# Patient Record
Sex: FEMALE | Race: BLACK OR AFRICAN AMERICAN
[De-identification: names, ages, dates, MRNs, and addresses within clinical notes are randomized per-mention and may not be internally consistent; named-entity substitution may affect disease eponyms.]

---

## 2020-06-03 ENCOUNTER — HOSPITAL ENCOUNTER (EMERGENCY)
Dept: HOSPITAL 61 - ER | Age: 70
LOS: 1 days | Discharge: HOME | End: 2020-06-04
Payer: MEDICARE

## 2020-06-03 VITALS — WEIGHT: 146.17 LBS | HEIGHT: 61 IN | BODY MASS INDEX: 27.6 KG/M2

## 2020-06-03 DIAGNOSIS — M79.662: Primary | ICD-10-CM

## 2020-06-03 DIAGNOSIS — Z88.0: ICD-10-CM

## 2020-06-03 DIAGNOSIS — Z88.5: ICD-10-CM

## 2020-06-03 DIAGNOSIS — E78.00: ICD-10-CM

## 2020-06-03 DIAGNOSIS — Z90.710: ICD-10-CM

## 2020-06-03 DIAGNOSIS — F17.200: ICD-10-CM

## 2020-06-03 DIAGNOSIS — Z88.8: ICD-10-CM

## 2020-06-03 DIAGNOSIS — I10: ICD-10-CM

## 2020-06-03 DIAGNOSIS — Z86.73: ICD-10-CM

## 2020-06-03 LAB
ALBUMIN SERPL-MCNC: 3.5 G/DL (ref 3.4–5)
ALBUMIN/GLOB SERPL: 1 {RATIO} (ref 1–1.7)
ALP SERPL-CCNC: 112 U/L (ref 46–116)
ALT SERPL-CCNC: 16 U/L (ref 14–59)
ANION GAP SERPL CALC-SCNC: 10 MMOL/L (ref 6–14)
AST SERPL-CCNC: 12 U/L (ref 15–37)
BASOPHILS # BLD AUTO: 0.1 X10^3/UL (ref 0–0.2)
BASOPHILS NFR BLD: 1 % (ref 0–3)
BILIRUB SERPL-MCNC: 0.1 MG/DL (ref 0.2–1)
BUN SERPL-MCNC: 26 MG/DL (ref 7–20)
BUN/CREAT SERPL: 16 (ref 6–20)
CALCIUM SERPL-MCNC: 9.4 MG/DL (ref 8.5–10.1)
CHLORIDE SERPL-SCNC: 108 MMOL/L (ref 98–107)
CO2 SERPL-SCNC: 25 MMOL/L (ref 21–32)
CREAT SERPL-MCNC: 1.6 MG/DL (ref 0.6–1)
EOSINOPHIL NFR BLD: 0.1 X10^3/UL (ref 0–0.7)
EOSINOPHIL NFR BLD: 2 % (ref 0–3)
ERYTHROCYTE [DISTWIDTH] IN BLOOD BY AUTOMATED COUNT: 17.4 % (ref 11.5–14.5)
GFR SERPLBLD BASED ON 1.73 SQ M-ARVRAT: 38.7 ML/MIN
GLOBULIN SER-MCNC: 3.5 G/DL (ref 2.2–3.8)
GLUCOSE SERPL-MCNC: 117 MG/DL (ref 70–99)
HCT VFR BLD CALC: 37.2 % (ref 36–47)
HGB BLD-MCNC: 12.5 G/DL (ref 12–15.5)
LYMPHOCYTES # BLD: 2.6 X10^3/UL (ref 1–4.8)
LYMPHOCYTES NFR BLD AUTO: 32 % (ref 24–48)
MCH RBC QN AUTO: 27 PG (ref 25–35)
MCHC RBC AUTO-ENTMCNC: 34 G/DL (ref 31–37)
MCV RBC AUTO: 81 FL (ref 79–100)
MONO #: 0.7 X10^3/UL (ref 0–1.1)
MONOCYTES NFR BLD: 9 % (ref 0–9)
NEUT #: 4.6 X10^3/UL (ref 1.8–7.7)
NEUTROPHILS NFR BLD AUTO: 57 % (ref 31–73)
PLATELET # BLD AUTO: 322 X10^3/UL (ref 140–400)
POTASSIUM SERPL-SCNC: 3.5 MMOL/L (ref 3.5–5.1)
PROT SERPL-MCNC: 7 G/DL (ref 6.4–8.2)
RBC # BLD AUTO: 4.59 X10^6/UL (ref 3.5–5.4)
SODIUM SERPL-SCNC: 143 MMOL/L (ref 136–145)
WBC # BLD AUTO: 8.1 X10^3/UL (ref 4–11)

## 2020-06-03 PROCEDURE — 80053 COMPREHEN METABOLIC PANEL: CPT

## 2020-06-03 PROCEDURE — 85379 FIBRIN DEGRADATION QUANT: CPT

## 2020-06-03 PROCEDURE — 85025 COMPLETE CBC W/AUTO DIFF WBC: CPT

## 2020-06-03 PROCEDURE — 73590 X-RAY EXAM OF LOWER LEG: CPT

## 2020-06-03 PROCEDURE — 99285 EMERGENCY DEPT VISIT HI MDM: CPT

## 2020-06-03 PROCEDURE — 36415 COLL VENOUS BLD VENIPUNCTURE: CPT

## 2020-06-03 NOTE — RAD
EXAM: AP lateral views left tibia/fibula

 

DATE: 6/3/2020 11:11 PM

 

INDICATION: Left lower leg pain 

 

COMPARISON: No Prior

 

FINDINGS:

No evidence of acute fracture or dislocation. Diffuse soft tissue swelling

about the left lower leg. Small posterior calcaneal osteophyte.

 

IMPRESSION:

No evidence of acute fracture or dislocation.

 

Electronically signed by: Wesley Fernandez MD (6/3/2020 11:34 PM) [facility]

## 2020-06-03 NOTE — PHYS DOC
Past Medical History


Past Medical History:  High Cholesterol, Hypertension, Kidney Infection, TIA, 

Other


Additional Past Medical Histor:  KIDNEY DISEASE


Past Surgical History:  Hysterectomy, Other


Additional Past Surgical Histo:  CAROTID ENDARDARECTOMY, BREAST BIOPSY x2


Smoking Status:  Current Every Day Smoker


Additional Information:  


1/2 ppd


Alcohol Use:  None


Drug Use:  Marijuana





General Adult


EDM:


Chief Complaint:  LOWER EXT PAIN





HPI:


HPI:





Patient is a 70  year old female who presents with complaint of left lower leg 

pain that started earlier today.  Patient states that it feels like it swollen 

and is getting painful in the calf.  She states that pain is worsened with 

weightbearing.  She denies any chest pain, cough or shortness of breath.  

Patient rates pain at a 6 out of 10.  She denies any fever.  []





Review of Systems:


Review of Systems:


Constitutional:  Denies fever or chills. []


Respiratory:  Denies cough or shortness of breath. [] 


Cardiovascular:  Denies chest pain or edema. [] 


GI:  Denies abdominal pain, nausea, vomiting, bloody stools or diarrhea. [] 


Musculoskeletal: Complains of left lower leg pain. [] 


Integument:  Denies rash. [] 


Neurologic:  Denies headache, focal weakness or sensory changes. [] 





A full 10 point review of systems has been reviewed and is otherwise negative.





Heart Score:


Risk Factors:


Risk Factors:  DM, Current or recent (<one month) smoker, HTN, HLP, family 

history of CAD, obesity.


Risk Scores:


Score 0 - 3:  2.5% MACE over next 6 weeks - Discharge Home


Score 4 - 6:  20.3% MACE over next 6 weeks - Admit for Clinical Observation


Score 7 - 10:  72.7% MACE over next 6 weeks - Early Invasive Strategies





Current Medications:





Current Medications








 Medications


  (Trade)  Dose


 Ordered  Sig/Linda  Start Time


 Stop Time Status Last Admin


Dose Admin


 


 Acetaminophen/


 Hydrocodone Bitart


  (Lortab 7.5/325)  1 tab  1X  ONCE  6/3/20 22:30


 6/3/20 22:31   





 


 Sodium Chloride  1,000 ml @ 


 1,000 mls/hr  Q1H  6/3/20 22:00


 6/3/20 22:59   














Allergies:


Allergies:





Allergies








Coded Allergies Type Severity Reaction Last Updated Verified


 


  Penicillins Allergy Intermediate ROCEPHIN OK 4/1/16 No


 


  morphine Allergy Intermediate  1/11/15 No


 


  propoxyphene Adverse Reaction Mild "I CRIES" 3/31/16 Yes











Physical Exam:


PE:





Constitutional: Well developed, well nourished, no acute distress, non-toxic 

appearance. []


HENT: Normocephalic, atraumatic, bilateral external ears normal, oropharynx 

moist, no oral exudates, nose normal. []


Eyes: PERRLA, EOMI, conjunctiva normal, no discharge. [] 


Neck: Normal range of motion, no tenderness, supple, no stridor. [] 


Cardiovascular: Regular rate and rhythm []


Lungs & Thorax:  Bilateral breath sounds clear to auscultation []


Abdomen: Bowel sounds normal, soft, no tenderness. [] 


Skin: Warm, dry, no erythema, no rash. [] 


Extremities: Left lower extremity demonstrates swelling and tenderness in the 

calf. [] 


Neurologic: Alert and oriented X 3, no focal deficits noted. []





Current Patient Data:


Vital Signs:





                                   Vital Signs








  Date Time  Temp Pulse Resp B/P (MAP) Pulse Ox O2 Delivery O2 Flow Rate FiO2


 


6/3/20 21:38 98.2 67 16 157/70 (99) 98 Room Air  





 98.2       











EKG:


EKG:


[]





Radiology/Procedures:


Radiology/Procedures:


[]


Impression:


PROCEDURE: TIBIA FIBULA LEFT





EXAM: AP lateral views left tibia/fibula


 


DATE: 6/3/2020 11:11 PM


 


INDICATION: Left lower leg pain 


 


COMPARISON: No Prior


 


FINDINGS:


No evidence of acute fracture or dislocation. Diffuse soft tissue swelling


about the left lower leg. Small posterior calcaneal osteophyte.


 


IMPRESSION:


No evidence of acute fracture or dislocation.


 


Electronically signed by: Wesley Fernandez MD (6/3/2020 11:34 PM) [facility]











Course & Med Decision Making:


Course & Med Decision Making


Pertinent Labs and Imaging studies reviewed. (See chart for details)





[]





Dragon Disclaimer:


Dragon Disclaimer:


This electronic medical record was generated, in whole or in part, using a voice

 recognition dictation system.





Departure


Departure


Impression:  


   Primary Impression:  


   Left leg pain


Disposition:  01 HOME, SELF-CARE


Condition:  STABLE


Referrals:  


SUGAR BERMUDEZ (PCP)


Patient Instructions:  Leg Cramps, Musculoskeletal Pain


Scripts


Hydrocodone/Apap 5-325 (NORCO 5-325 TABLET) 1 Each Tablet


1-2 EACH PO PRN Q6HRS PRN for PAIN, #15


   as needed for pain


   Prov: ELISHA ZULUAGA Jr. DO         6/4/20











ELISHA ZULUAGA Jr. DO           [place] 3, 2020 22:31

## 2020-06-04 VITALS — DIASTOLIC BLOOD PRESSURE: 80 MMHG | SYSTOLIC BLOOD PRESSURE: 151 MMHG
